# Patient Record
Sex: MALE | Race: WHITE | NOT HISPANIC OR LATINO | Employment: OTHER | URBAN - METROPOLITAN AREA
[De-identification: names, ages, dates, MRNs, and addresses within clinical notes are randomized per-mention and may not be internally consistent; named-entity substitution may affect disease eponyms.]

---

## 2019-03-24 PROBLEM — I77.810 AORTIC ECTASIA, THORACIC (HCC): Status: ACTIVE | Noted: 2019-03-24

## 2019-03-24 PROBLEM — D69.6 THROMBOCYTOPENIA (HCC): Status: ACTIVE | Noted: 2019-03-24

## 2019-03-24 PROBLEM — I27.21 SECONDARY PULMONARY ARTERIAL HYPERTENSION (HCC): Status: ACTIVE | Noted: 2019-03-24

## 2019-03-27 PROBLEM — Z79.01 ANTICOAGULANT LONG-TERM USE: Status: ACTIVE | Noted: 2019-03-27

## 2019-07-03 PROBLEM — D75.89 MACROCYTOSIS: Status: ACTIVE | Noted: 2019-07-03

## 2019-11-19 PROCEDURE — 88305 TISSUE EXAM BY PATHOLOGIST: CPT | Performed by: INTERNAL MEDICINE

## 2019-11-19 PROCEDURE — 88321 CONSLTJ&REPRT SLD PREP ELSWR: CPT | Performed by: INTERNAL MEDICINE

## 2020-07-08 PROBLEM — D68.69 SECONDARY HYPERCOAGULABLE STATE (HCC): Status: ACTIVE | Noted: 2020-07-08

## 2021-01-01 ENCOUNTER — HOSPITAL ENCOUNTER (EMERGENCY)
Age: 66
End: 2021-11-09
Attending: EMERGENCY MEDICINE

## 2021-01-01 DIAGNOSIS — I46.9 CARDIAC ARREST (CMD): Primary | ICD-10-CM

## 2021-01-01 LAB
GLUCOSE BLDC GLUCOMTR-MCNC: 31 MG/DL (ref 70–99)
SARS-COV-2 RNA RESP QL NAA+PROBE: NOT DETECTED
SERVICE CMNT-IMP: NORMAL
SERVICE CMNT-IMP: NORMAL

## 2021-01-01 PROCEDURE — 36556 INSERT NON-TUNNEL CV CATH: CPT

## 2021-01-01 PROCEDURE — 36556 INSERT NON-TUNNEL CV CATH: CPT | Performed by: EMERGENCY MEDICINE

## 2021-01-01 PROCEDURE — 82962 GLUCOSE BLOOD TEST: CPT

## 2021-01-01 PROCEDURE — 99285 EMERGENCY DEPT VISIT HI MDM: CPT

## 2021-01-01 PROCEDURE — 10004281 HB COUNTER-STAFF TIME PER 15 MIN

## 2021-01-01 PROCEDURE — 10002801 HB RX 250 W/O HCPCS: Performed by: EMERGENCY MEDICINE

## 2021-01-01 PROCEDURE — 92950 HEART/LUNG RESUSCITATION CPR: CPT

## 2021-01-01 PROCEDURE — C9803 HOPD COVID-19 SPEC COLLECT: HCPCS

## 2021-01-01 PROCEDURE — 87635 SARS-COV-2 COVID-19 AMP PRB: CPT | Performed by: EMERGENCY MEDICINE

## 2021-01-01 PROCEDURE — 10002800 HB RX 250 W HCPCS: Performed by: EMERGENCY MEDICINE

## 2021-01-01 RX ORDER — DEXTROSE MONOHYDRATE 25 G/50ML
INJECTION, SOLUTION INTRAVENOUS PRN
Status: COMPLETED | OUTPATIENT
Start: 2021-01-01 | End: 2021-01-01

## 2021-01-01 RX ADMIN — DEXTROSE MONOHYDRATE 25 G: 25 INJECTION, SOLUTION INTRAVENOUS at 18:19

## 2021-01-01 RX ADMIN — EPINEPHRINE 1 MG: 0.1 INJECTION, SOLUTION ENDOTRACHEAL; INTRACARDIAC; INTRAVENOUS at 18:17

## 2021-03-16 PROBLEM — Z79.01 ANTICOAGULANT LONG-TERM USE: Status: RESOLVED | Noted: 2019-03-27 | Resolved: 2021-03-16

## 2021-03-16 PROBLEM — E53.8 FOLATE DEFICIENCY: Status: ACTIVE | Noted: 2021-03-16

## 2021-03-16 PROBLEM — D75.89 MACROCYTOSIS: Status: RESOLVED | Noted: 2019-07-03 | Resolved: 2021-03-16

## 2021-03-16 PROBLEM — N52.9 ERECTILE DYSFUNCTION, UNSPECIFIED ERECTILE DYSFUNCTION TYPE: Status: ACTIVE | Noted: 2021-03-16

## 2021-03-16 PROBLEM — M47.816 LUMBAR SPONDYLOSIS: Status: ACTIVE | Noted: 2021-03-16

## 2021-10-28 ENCOUNTER — HOSPITAL ENCOUNTER (OUTPATIENT)
Facility: HOSPITAL | Age: 66
Setting detail: OBSERVATION
Discharge: HOME | End: 2021-10-29
Attending: EMERGENCY MEDICINE | Admitting: INTERNAL MEDICINE
Payer: MEDICARE

## 2021-10-28 DIAGNOSIS — I44.7 LEFT BUNDLE BRANCH BLOCK (LBBB): ICD-10-CM

## 2021-10-28 DIAGNOSIS — I48.91 ATRIAL FIBRILLATION WITH CONTROLLED VENTRICULAR RESPONSE (HCC): ICD-10-CM

## 2021-10-28 DIAGNOSIS — I95.9 HYPOTENSION, UNSPECIFIED HYPOTENSION TYPE: Primary | ICD-10-CM

## 2021-10-28 PROBLEM — D69.2 SENILE PURPURA (HCC): Status: ACTIVE | Noted: 2021-10-28

## 2021-10-28 PROCEDURE — 82533 TOTAL CORTISOL: CPT | Performed by: INTERNAL MEDICINE

## 2021-10-28 PROCEDURE — 85025 COMPLETE CBC W/AUTO DIFF WBC: CPT | Performed by: EMERGENCY MEDICINE

## 2021-10-28 PROCEDURE — 93010 ELECTROCARDIOGRAM REPORT: CPT | Performed by: EMERGENCY MEDICINE

## 2021-10-28 PROCEDURE — 96360 HYDRATION IV INFUSION INIT: CPT

## 2021-10-28 PROCEDURE — 84484 ASSAY OF TROPONIN QUANT: CPT | Performed by: EMERGENCY MEDICINE

## 2021-10-28 PROCEDURE — 99285 EMERGENCY DEPT VISIT HI MDM: CPT

## 2021-10-28 PROCEDURE — 93005 ELECTROCARDIOGRAM TRACING: CPT

## 2021-10-28 PROCEDURE — 83605 ASSAY OF LACTIC ACID: CPT | Performed by: EMERGENCY MEDICINE

## 2021-10-28 PROCEDURE — 80048 BASIC METABOLIC PNL TOTAL CA: CPT | Performed by: EMERGENCY MEDICINE

## 2021-10-28 RX ORDER — ACETAMINOPHEN 325 MG/1
650 TABLET ORAL EVERY 6 HOURS PRN
Status: DISCONTINUED | OUTPATIENT
Start: 2021-10-28 | End: 2021-10-29

## 2021-10-28 RX ORDER — POTASSIUM CHLORIDE 20 MEQ/1
40 TABLET, EXTENDED RELEASE ORAL EVERY 4 HOURS
Status: COMPLETED | OUTPATIENT
Start: 2021-10-28 | End: 2021-10-28

## 2021-10-28 RX ORDER — METOCLOPRAMIDE HYDROCHLORIDE 5 MG/ML
10 INJECTION INTRAMUSCULAR; INTRAVENOUS EVERY 8 HOURS PRN
Status: DISCONTINUED | OUTPATIENT
Start: 2021-10-28 | End: 2021-10-29

## 2021-10-28 RX ORDER — ATORVASTATIN CALCIUM 40 MG/1
40 TABLET, FILM COATED ORAL DAILY
Status: DISCONTINUED | OUTPATIENT
Start: 2021-10-29 | End: 2021-10-29

## 2021-10-28 RX ORDER — MESALAMINE 400 MG/1
1600 CAPSULE, DELAYED RELEASE ORAL 3 TIMES DAILY
Status: DISCONTINUED | OUTPATIENT
Start: 2021-10-29 | End: 2021-10-29

## 2021-10-28 RX ORDER — ONDANSETRON 2 MG/ML
4 INJECTION INTRAMUSCULAR; INTRAVENOUS EVERY 6 HOURS PRN
Status: DISCONTINUED | OUTPATIENT
Start: 2021-10-28 | End: 2021-10-29

## 2021-10-28 RX ORDER — ALPRAZOLAM 0.25 MG/1
0.25 TABLET ORAL 2 TIMES DAILY PRN
Status: DISCONTINUED | OUTPATIENT
Start: 2021-10-28 | End: 2021-10-29

## 2021-10-28 RX ORDER — ONDANSETRON 2 MG/ML
4 INJECTION INTRAMUSCULAR; INTRAVENOUS EVERY 4 HOURS PRN
Status: ACTIVE | OUTPATIENT
Start: 2021-10-28 | End: 2021-10-28

## 2021-10-28 RX ORDER — PREDNISONE 1 MG/1
5 TABLET ORAL
Status: DISCONTINUED | OUTPATIENT
Start: 2021-10-29 | End: 2021-10-29

## 2021-10-28 RX ORDER — SODIUM CHLORIDE 9 MG/ML
INJECTION, SOLUTION INTRAVENOUS CONTINUOUS
Status: DISCONTINUED | OUTPATIENT
Start: 2021-10-28 | End: 2021-10-29

## 2021-10-28 RX ORDER — LEVOTHYROXINE SODIUM 0.05 MG/1
100 TABLET ORAL
Status: DISCONTINUED | OUTPATIENT
Start: 2021-10-29 | End: 2021-10-29

## 2021-10-28 NOTE — CONSULTS
ASSESSMENT/PLAN:     Impression: 1. Hypotension in a 78-year-old male with a cardiomyopathy and A. fib. This is likely multifactorial due to his multiple blood pressure lowering meds and possible adrenal insufficiency.   2.  Chronic atrial fibrillation chest pain shortness of breath. Is been able to take his other medicines. There is no palpitations. He denies fever, chills, sweats, decreased p.o. intake    No current outpatient medications on file.       Past Medical History:   Diagnosis Date   • Anxi EYES:conjunctiva not injected, no xanthelasma. ENT:mucosa pink and moist. NECK:jugular venous pressure not elevated. RESP:normal rate and rhythm, clear to auscultation. GI:soft, non-tender;rectal deferred. MS:adequate gait for exercise testing.  EXT:no club

## 2021-10-28 NOTE — ED QUICK NOTES
Orders for admission, patient is aware of plan and ready to go upstairs. Any questions, please call ED RN Lola Yung at extension [phone].    Type of COVID test sent: Rapid  COVID Suspicion level: Low        LOC at time of transport: a&o 4/4

## 2021-10-28 NOTE — H&P
[facility] Hospitalist Team  History and Physical       Assessment/Plan:     #Hypotension  -no evidence of infection  -did take his BP meds today - will hold  -check echo  -self DC his chronic steroids 2 weeks ago raising concern for adrenal insufficiency.  Will c 10/2021    • Dilated cardiomyopathy [facility])    • Erectile dysfunction    • Hypercholesterolemia    • Hypothyroidism due to acquired atrophy of thyroid 8/14/2015   • Lumbar spondylosis, MRI 2015    • Lung granuloma ([address] - on CT 10/2021    • PAF (paroxysmal a Cancer Father                Review of Systems   CONSTITUTIONAL:  As per HPI (+) weight loss (-) fevers  HEENT:  Eyes:  (-) diplopia (-) blurred vision   ENT:  (-) earache (-) sore throat (-) runny nose  CARDIOVASCULAR:  (-) chest pain  RESPIRATORY Radiology: No results found. Outpatient records or previous hospital records reviewed. [facility] hospitalist to continue to follow patient while in house  A total of 70 minutes taken with patient and coordinating care.   Greater than 50% face to face

## 2021-10-28 NOTE — ED INITIAL ASSESSMENT (HPI)
Pt seen at OSH a few days ago for dizziness with hx vertigo. Found to be hypotensive and feeling more dizzy in PCP office today during ER f/u visit.

## 2021-10-28 NOTE — PROGRESS NOTES
Lialdia (mesalamine) 1.2 g tabs is a non-formulary medication and will be therapeutically interchanged to Mesalamine (Delzicol) 400 mg tabs per P&T protocol

## 2021-10-29 ENCOUNTER — APPOINTMENT (OUTPATIENT)
Dept: CV DIAGNOSTICS | Facility: HOSPITAL | Age: 66
End: 2021-10-29
Attending: INTERNAL MEDICINE
Payer: MEDICARE

## 2021-10-29 VITALS
OXYGEN SATURATION: 96 % | BODY MASS INDEX: 28.23 KG/M2 | RESPIRATION RATE: 20 BRPM | DIASTOLIC BLOOD PRESSURE: 65 MMHG | WEIGHT: 179.88 LBS | HEART RATE: 104 BPM | TEMPERATURE: 98 F | SYSTOLIC BLOOD PRESSURE: 105 MMHG | HEIGHT: 67 IN

## 2021-10-29 PROCEDURE — 85025 COMPLETE CBC W/AUTO DIFF WBC: CPT | Performed by: INTERNAL MEDICINE

## 2021-10-29 PROCEDURE — 80048 BASIC METABOLIC PNL TOTAL CA: CPT | Performed by: INTERNAL MEDICINE

## 2021-10-29 PROCEDURE — 93306 TTE W/DOPPLER COMPLETE: CPT | Performed by: INTERNAL MEDICINE

## 2021-10-29 PROCEDURE — 84132 ASSAY OF SERUM POTASSIUM: CPT | Performed by: INTERNAL MEDICINE

## 2021-10-29 RX ORDER — PREDNISONE 1 MG/1
5 TABLET ORAL
Qty: 30 TABLET | Refills: 0 | Status: SHIPPED | OUTPATIENT
Start: 2021-10-30 | End: 2021-11-02

## 2021-10-29 NOTE — PROGRESS NOTES
ASSESSMENT/PLAN:     Impression: 1. Hypotension in a 75-year-old male with a cardiomyopathy and A. fib. This is likely multifactorial due to his multiple blood pressure lowering meds and possible adrenal insufficiency.   2.  Chronic atrial fibrillation Use: Never used    Alcohol use: Yes      Alcohol/week: 2.0 standard drinks      Types: 2 Glasses of wine per week      Comment: Rare wine or mixed drink    Drug use: No       REVIEW OF SYSTEMS:   CONS:denies fever, chills, significant weight change.  CV:see

## 2021-10-29 NOTE — CM/SW NOTE
10/29/21 1400   Discharge disposition   Expected discharge disposition Home or Self   Home services after discharge None   Discharge transportation Private car     Pt discussed during nursing rounds. Pt is stable for dc today. MD dc order entered.   No h

## 2021-10-29 NOTE — PLAN OF CARE
Problem: Patient Centered Care  Goal: Patient preferences are identified and integrated in the patient's plan of care  Description: Interventions:  - What would you like us to know as we care for you?  Lives at home alone, I do everything on my own  - Pro Progressing     Problem: SAFETY ADULT - FALL  Goal: Free from fall injury  Description: INTERVENTIONS:  - Assess pt frequently for physical needs  - Identify cognitive and physical deficits and behaviors that affect risk of falls.   - [place] fall precaut hematoma  - Assess quality of pulses, skin color and temperature  - Assess for signs of decreased coronary artery perfusion - ex.  Angina  - Evaluate fluid balance, assess for edema, trend weights  Outcome: Progressing  Goal: Absence of cardiac arrhythmias

## 2021-10-29 NOTE — PLAN OF CARE
Rec'd order for discharge, saline lock and telemetry monitor removed. Patient given discharge instructions including when to take next doses and to make follow up appt's with MD's. Patient discharged in good condition to a taxi for ride home.   Problem: [place] Manage/alleviate anxiety  - Utilize distraction and/or relaxation techniques  - Monitor for opioid side effects  - Notify MD/LIP if interventions unsuccessful or patient reports new pain  - Anticipate increased pain with activity and pre-medicate as approp stability  Description: INTERVENTIONS:  - Monitor vital signs, rhythm, and trends  - Monitor for bleeding, hypotension and signs of decreased cardiac output  - Evaluate effectiveness of vasoactive medications to optimize hemodynamic stability  - Monitor ar

## 2021-11-01 ENCOUNTER — PATIENT OUTREACH (OUTPATIENT)
Dept: CASE MANAGEMENT | Age: 66
End: 2021-11-01

## 2021-11-01 NOTE — PROGRESS NOTES
Dr Lyn Apa  [facility] [place]  [address]  Follow up 2 weeks  Dr Cl Torres office will call pt due to no availability in needed time frame  Confirmed w/pt

## 2021-11-05 NOTE — DISCHARGE SUMMARY
General Medicine Discharge Summary     Patient ID:  Mariela Lux  74year old  4/17/1955    Admit date: 10/28/2021    Discharge date and time: 10/29/2021  6:20 PM     Attending Physician: No att. providers found     Consults: IP CONSULT TO CARDIOLOGY    Prim cardiomyopathy, hypothyroidism, UC on chronic steroids, thrombocytopenia, PAF on AC, and HL who presents to ER with hypotension and dizziness. Patient was seen by cardiology. Patient's echocardiogram was significant for a EF of 18%.   Patient is recommend Eliquis  Take 1 tablet (5 mg total) by mouth 2 (two) times daily. atorvastatin 40 MG Tabs  Commonly known as: LIPITOR  Take 1 tablet (40 mg total) by mouth daily.      levothyroxine 100 MCG Tabs  Commonly known as: SYNTHROID  Take 1 tablet (100 mcg tota